# Patient Record
Sex: MALE | Race: WHITE | NOT HISPANIC OR LATINO | Employment: STUDENT | URBAN - METROPOLITAN AREA
[De-identification: names, ages, dates, MRNs, and addresses within clinical notes are randomized per-mention and may not be internally consistent; named-entity substitution may affect disease eponyms.]

---

## 2018-03-07 ENCOUNTER — OFFICE VISIT (OUTPATIENT)
Dept: ORTHOPEDIC SURGERY | Facility: CLINIC | Age: 53
End: 2018-03-07

## 2018-03-07 VITALS — HEIGHT: 72 IN | WEIGHT: 210 LBS | BODY MASS INDEX: 28.44 KG/M2 | TEMPERATURE: 98.7 F

## 2018-03-07 DIAGNOSIS — G89.29 CHRONIC RIGHT SHOULDER PAIN: Primary | ICD-10-CM

## 2018-03-07 DIAGNOSIS — M19.019 SHOULDER ARTHRITIS: ICD-10-CM

## 2018-03-07 DIAGNOSIS — M25.511 CHRONIC RIGHT SHOULDER PAIN: Primary | ICD-10-CM

## 2018-03-07 PROCEDURE — 73030 X-RAY EXAM OF SHOULDER: CPT | Performed by: ORTHOPAEDIC SURGERY

## 2018-03-07 PROCEDURE — 99204 OFFICE O/P NEW MOD 45 MIN: CPT | Performed by: ORTHOPAEDIC SURGERY

## 2018-03-07 RX ORDER — NAPROXEN SODIUM 220 MG
220 TABLET ORAL 2 TIMES DAILY PRN
COMMUNITY

## 2018-03-07 RX ORDER — GABAPENTIN 600 MG/1
600 TABLET ORAL 3 TIMES DAILY
COMMUNITY

## 2018-03-07 RX ORDER — TRAMADOL HYDROCHLORIDE 50 MG/1
50 TABLET ORAL EVERY 6 HOURS PRN
COMMUNITY

## 2018-03-08 NOTE — PROGRESS NOTES
"Patient: Shon Casarez    YOB: 1965    Medical Record Number: [id number]    Chief Complaints:  Right shoulder pain    History of Present Illness:     52 y.o. male patient who presents with a long history of progressively worsening pain and dysfunction affecting the right shoulder.  He tells me that he had the shoulder \"cleaned out\" about 6 years ago.  He has continued to have problems ever since.  He was in the [employer] for 26 years and he feels that this is what contributed significantly to the onset of his symptoms.  He describes his pain as severe, intermittent, and grinding.  He notices clicking and popping in the shoulder.  Rest, ice, anti-inflammatories and injections have all helped in the past.  Initially, the injections were very beneficial but the last injection provided only transient relief for him.  He feels that his symptoms are getting worse.  This is affecting his quality of life as well as his work.  He comes in today hoping to discuss further options.  Of note, he does have symptoms in the left shoulder as well but these are much more mild.  He also tells me that he has a history of a previous distal biceps tendon rupture in the right arm.  An attempt to fix this was made but it failed and he developed recurrent deformity.  This is been a long-standing issue.    Allergies: No Known Allergies    Home Medications:    Current Outpatient Prescriptions:   •  gabapentin (NEURONTIN) 600 MG tablet, Take 600 mg by mouth 3 (Three) Times a Day., Disp: , Rfl:   •  naproxen sodium (ALEVE) 220 MG tablet, Take 220 mg by mouth 2 (Two) Times a Day As Needed., Disp: , Rfl:   •  traMADol (ULTRAM) 50 MG tablet, Take 50 mg by mouth Every 6 (Six) Hours As Needed for Moderate Pain ., Disp: , Rfl:   •  TraZODone & Diet Manage Prod (TRAZAMINE) 50 MG misc, Take  by mouth., Disp: , Rfl:     History reviewed. No pertinent past medical history.    Past Surgical History:   Procedure Laterality Date   • KNEE " "SURGERY Left     replacement   • KNEE SURGERY Left     scope   • SHOULDER SURGERY Bilateral     scope       Social History     Occupational History   • Not on file.     Social History Main Topics   • Smoking status: Never Smoker   • Smokeless tobacco: Never Used   • Alcohol use Not on file   • Drug use: Not on file   • Sexual activity: Not on file      Social History     Social History Narrative   • No narrative on file       History reviewed. No pertinent family history.    Review of Systems:      Constitutional: Denies fever, shaking or chills   Eyes: Denies change in visual acuity   HEENT: Denies nasal congestion or sore throat   Respiratory: Denies cough or shortness of breath   Cardiovascular: Denies chest pain or edema  Endocrine: Denies tremors, palpitations, intolerance of heat or cold, polyuria, polydipsia.  GI: Denies abdominal pain, nausea, vomiting, bloody stools or diarrhea  : Denies frequency, urgency, incontinence, retention, or nocturia.  Musculoskeletal: Denies numbness tingling or loss of motor function except as above  Integument: Denies rash, lesion or ulceration   Neurologic: Denies headache or focal weakness, deficits  Heme: Denies epistaxis, spontaneous or excessive bleeding, epistaxis, hematuria, melena, fatigue, enlarged or tender lymph nodes.      All other pertinent positives and negatives as noted above in HPI.    Physical Exam: 52 y.o. male  Vitals:    03/07/18 1005   Temp: 98.7 °F (37.1 °C)   Weight: 95.3 kg (210 lb)   Height: 182.9 cm (72\")     General:  Patient is awake and alert.  Appears in no acute distress or discomfort.    Psych:  Affect and demeanor are appropriate.    Eyes:  Conjunctiva and sclera appear grossly normal.  Eyes track well and EOM seem to be intact.    Ears:  No gross abnormalities.  Hearing adequate for the exam.    Cardiovascular:  Regular rate and rhythm.    Lungs:  Good chest expansion.  Breathing unlabored.    Spine:  Neck appears grossly normal.  No " palpable masses or adenopathy.  Good motion.  Spurling's maneuver is negative for any shoulder or arm symptoms.    Extremities:  Skin is benign.  No obvious gross abnormalities about right shoulder.  No palpable masses or adenopathy.  Moderate tenderness noted over anterior glenohumeral joint and rotator interval.  Motion is to 160° of forward elevation, 35° external rotation, lacks 10° of horizontal abduction, T12 internal rotation.  No instability.  Rotator cuff strength seems to be well preserved but the exam is limited due to patient discomfort with resisted active motion.  Good motor and sensory function in lower arm and hand.  Palpable pulses.         Radiology:  AP, scapular Y, and axillary views of the right shoulder are ordered by myself and reviewed to evaluate the patient's complaint.  No comparison films are immediately available.  The x-rays show moderate glenohumeral osteoarthritis with joint space narrowing, osteophyte formation, and subchondral sclerosis.  The acromiohumeral interval measures normal.    Assessment/Plan:  Right shoulder osteoarthritis    We discussed treatment options in detail including the risks, benefits, and alternatives of conservative treatment versus surgical options.  He has tried activity modifications, anti-inflammatories and injections in the past.  None of these conservative treatments have offered him significant relief.  He has expressed interest in pursuing an arthroplasty.  The risks, benefits, and alternatives were discussed.  He wants to pursue that.  We will look at getting him scheduled for that.  I do want to see him back for a preoperative visit.      Procedures      George Mcadams MD    03/07/2018    CC to Alix Olmstead MD

## 2018-11-26 ENCOUNTER — TELEPHONE (OUTPATIENT)
Dept: ORTHOPEDIC SURGERY | Facility: CLINIC | Age: 53
End: 2018-11-26

## 2018-11-26 ENCOUNTER — PREP FOR SURGERY (OUTPATIENT)
Dept: OTHER | Facility: HOSPITAL | Age: 53
End: 2018-11-26

## 2018-11-26 DIAGNOSIS — M19.019 SHOULDER ARTHRITIS: Primary | ICD-10-CM

## 2018-11-26 RX ORDER — CEFAZOLIN SODIUM 2 G/100ML
2 INJECTION, SOLUTION INTRAVENOUS ONCE
Status: CANCELLED | OUTPATIENT
Start: 2018-12-19 | End: 2018-11-26

## 2018-11-26 RX ORDER — PREGABALIN 75 MG/1
150 CAPSULE ORAL ONCE
Status: CANCELLED | OUTPATIENT
Start: 2018-12-19 | End: 2018-11-26

## 2018-11-26 RX ORDER — VANCOMYCIN HYDROCHLORIDE 1 G/200ML
1000 INJECTION, SOLUTION INTRAVENOUS ONCE
Status: CANCELLED | OUTPATIENT
Start: 2018-12-19 | End: 2018-11-26

## 2018-11-26 RX ORDER — MELOXICAM 15 MG/1
15 TABLET ORAL ONCE
Status: CANCELLED | OUTPATIENT
Start: 2018-12-19 | End: 2018-11-26

## 2018-11-26 RX ORDER — ACETAMINOPHEN 500 MG
1000 TABLET ORAL ONCE
Status: CANCELLED | OUTPATIENT
Start: 2018-12-19 | End: 2018-11-26

## 2018-11-30 PROBLEM — M19.019 SHOULDER ARTHRITIS: Status: ACTIVE | Noted: 2018-11-30

## 2018-12-10 ENCOUNTER — APPOINTMENT (OUTPATIENT)
Dept: PREADMISSION TESTING | Facility: HOSPITAL | Age: 53
End: 2018-12-10

## 2022-10-10 ENCOUNTER — TELEPHONE (OUTPATIENT)
Dept: GASTROENTEROLOGY | Facility: CLINIC | Age: 57
End: 2022-10-10

## 2022-10-10 NOTE — TELEPHONE ENCOUNTER
----- Message from David Gutierrez sent at 10/10/2022  9:18 AM EDT -----  Contact: [phone]  Contact patient for oa questionairelza

## 2022-11-28 ENCOUNTER — PRE-PROCEDURE SCREENING (OUTPATIENT)
Dept: GASTROENTEROLOGY | Facility: CLINIC | Age: 57
End: 2022-11-28

## 2022-11-28 NOTE — TELEPHONE ENCOUNTER
Colonoscopy screening--No personal history of polyps--No family history of polyps or colon cancer--No blood thinners--Medications:                  gabapentin (NEURONTIN) 600 MG tablet  naproxen sodium (ALEVE) 220 MG tablet  traMADol (ULTRAM) 50 MG tablet  TraZODone & Diet Manage Prod (TRAZAMINE) 50 MG misc                     QUESTIONNAIRE SCEEENING & HAS BEEN SENT TO DOCTOR FOR REVIEW

## 2022-11-29 ENCOUNTER — PREP FOR SURGERY (OUTPATIENT)
Dept: OTHER | Facility: HOSPITAL | Age: 57
End: 2022-11-29

## 2022-11-29 DIAGNOSIS — Z12.11 ENCOUNTER FOR SCREENING FOR MALIGNANT NEOPLASM OF COLON: Primary | ICD-10-CM

## 2022-12-29 ENCOUNTER — TELEPHONE (OUTPATIENT)
Dept: GASTROENTEROLOGY | Facility: CLINIC | Age: 57
End: 2022-12-29
Payer: OTHER GOVERNMENT

## 2022-12-29 NOTE — TELEPHONE ENCOUNTER
Caller: Shon Casarez    Relationship to patient: Self    Best call back number: [phone]     Chief complaint: PT WANTS TO SCHEDULE SCOPE    Type of visit: COLONOSCOPY    Requested date: FIRST AVAILABLE       Additional notes:QUESTIONAIRE HAS BEEN RECEIVED

## 2023-01-03 PROBLEM — Z12.11 ENCOUNTER FOR SCREENING FOR MALIGNANT NEOPLASM OF COLON: Status: ACTIVE | Noted: 2023-01-03

## 2023-01-03 NOTE — TELEPHONE ENCOUNTER
ELSY Lucero for colonoscopy on 3/23/23 arrival at 1030am . Mailed Prep instructions to Mailing address on-file. ----[employer]

## 2023-01-23 ENCOUNTER — TELEPHONE (OUTPATIENT)
Dept: GASTROENTEROLOGY | Facility: CLINIC | Age: 58
End: 2023-01-23

## 2023-01-23 NOTE — TELEPHONE ENCOUNTER
Caller: Shon Casarez    Relationship to patient: Self    Best call back number: [phone]    Patient is needing: FOR OFFICE TO RE-MAIL HIS PREP INSTRUCTIONS FOR HIS UPCOMING COLONOSCOPY IN MARCH.

## 2023-03-23 ENCOUNTER — ANESTHESIA EVENT (OUTPATIENT)
Dept: GASTROENTEROLOGY | Facility: HOSPITAL | Age: 58
End: 2023-03-23
Payer: OTHER GOVERNMENT

## 2023-03-23 ENCOUNTER — HOSPITAL ENCOUNTER (OUTPATIENT)
Facility: HOSPITAL | Age: 58
Setting detail: HOSPITAL OUTPATIENT SURGERY
Discharge: HOME | End: 2023-03-23
Attending: INTERNAL MEDICINE | Admitting: INTERNAL MEDICINE
Payer: OTHER GOVERNMENT

## 2023-03-23 ENCOUNTER — ANESTHESIA (OUTPATIENT)
Dept: GASTROENTEROLOGY | Facility: HOSPITAL | Age: 58
End: 2023-03-23
Payer: OTHER GOVERNMENT

## 2023-03-23 VITALS
HEART RATE: 71 BPM | HEIGHT: 72 IN | RESPIRATION RATE: 7 BRPM | OXYGEN SATURATION: 93 % | SYSTOLIC BLOOD PRESSURE: 101 MMHG | BODY MASS INDEX: 30.65 KG/M2 | DIASTOLIC BLOOD PRESSURE: 69 MMHG | WEIGHT: 226.3 LBS

## 2023-03-23 DIAGNOSIS — Z12.11 ENCOUNTER FOR SCREENING FOR MALIGNANT NEOPLASM OF COLON: ICD-10-CM

## 2023-03-23 PROCEDURE — 45380 COLONOSCOPY AND BIOPSY: CPT | Performed by: INTERNAL MEDICINE

## 2023-03-23 PROCEDURE — 88305 TISSUE EXAM BY PATHOLOGIST: CPT | Performed by: INTERNAL MEDICINE

## 2023-03-23 PROCEDURE — S0260 H&P FOR SURGERY: HCPCS | Performed by: INTERNAL MEDICINE

## 2023-03-23 PROCEDURE — 25010000002 PROPOFOL 10 MG/ML EMULSION: Performed by: ANESTHESIOLOGY

## 2023-03-23 RX ORDER — LOSARTAN POTASSIUM 50 MG/1
50 TABLET ORAL DAILY
COMMUNITY

## 2023-03-23 RX ORDER — LIDOCAINE HYDROCHLORIDE 20 MG/ML
INJECTION, SOLUTION INFILTRATION; PERINEURAL AS NEEDED
Status: DISCONTINUED | OUTPATIENT
Start: 2023-03-23 | End: 2023-03-23 | Stop reason: SURG

## 2023-03-23 RX ORDER — SODIUM CHLORIDE, SODIUM LACTATE, POTASSIUM CHLORIDE, CALCIUM CHLORIDE 600; 310; 30; 20 MG/100ML; MG/100ML; MG/100ML; MG/100ML
30 INJECTION, SOLUTION INTRAVENOUS CONTINUOUS PRN
Status: DISCONTINUED | OUTPATIENT
Start: 2023-03-23 | End: 2023-03-23 | Stop reason: HOSPADM

## 2023-03-23 RX ORDER — HYDROCODONE BITARTRATE AND ACETAMINOPHEN 7.5; 325 MG/1; MG/1
1 TABLET ORAL EVERY 6 HOURS PRN
COMMUNITY

## 2023-03-23 RX ORDER — SERTRALINE HYDROCHLORIDE 100 MG/1
150 TABLET, FILM COATED ORAL DAILY
COMMUNITY

## 2023-03-23 RX ORDER — PROPOFOL 10 MG/ML
VIAL (ML) INTRAVENOUS AS NEEDED
Status: DISCONTINUED | OUTPATIENT
Start: 2023-03-23 | End: 2023-03-23 | Stop reason: SURG

## 2023-03-23 RX ADMIN — LIDOCAINE HYDROCHLORIDE 50 MG: 20 INJECTION, SOLUTION INFILTRATION; PERINEURAL at 11:06

## 2023-03-23 RX ADMIN — SODIUM CHLORIDE, POTASSIUM CHLORIDE, SODIUM LACTATE AND CALCIUM CHLORIDE 30 ML/HR: 600; 310; 30; 20 INJECTION, SOLUTION INTRAVENOUS at 10:34

## 2023-03-23 RX ADMIN — PROPOFOL 380 MG: 10 INJECTION, EMULSION INTRAVENOUS at 11:06

## 2023-03-23 NOTE — ANESTHESIA POSTPROCEDURE EVALUATION
Patient: Shon Casarez    Procedure Summary     Date: 03/23/23 Room / Location:  [facility]    Anesthesia Start: 1058 Anesthesia Stop: 1123    Procedure: COLONOSCOPY INTO CECUM/ TERMINAL ILEUM WITH POLYPECTOMY Diagnosis:       Encounter for screening for malignant neoplasm of colon      (Encounter for screening for malignant neoplasm of colon [Z12.11])    Surgeons: Bradley Hebert MD Provider: Cristobal Prince MD    Anesthesia Type: MAC ASA Status: 2          Anesthesia Type: MAC    Vitals  Vitals Value Taken Time   /69 03/23/23 1200   Temp     Pulse 71 03/23/23 1200   Resp 7 03/23/23 1200   SpO2 93 % 03/23/23 1150           Anesthesia Post Evaluation

## 2023-03-23 NOTE — H&P
"[facility]  Pre Procedure History & Physical    Chief Complaint:   Time for my colonoscopy    Subjective     HPI:   57 y.o. male presenting to endoscopy unit today for surveillance colonoscopy.    Past Medical History:   Past Medical History:   Diagnosis Date   • Anxiety    • Hyperlipidemia        Family History:  History reviewed. No pertinent family history.    Social History:   reports that he quit smoking about 5 years ago. His smoking use included cigarettes. He has never used smokeless tobacco. He reports that he does not currently use alcohol. He reports that he does not currently use drugs.    Medications:   Medications Prior to Admission   Medication Sig Dispense Refill Last Dose   • HYDROcodone-acetaminophen (NORCO) 7.5-325 MG per tablet Take 1 tablet by mouth Every 6 (Six) Hours As Needed for Moderate Pain.   3/22/2023   • losartan (COZAAR) 50 MG tablet Take 1 tablet by mouth Daily.   3/23/2023   • sertraline (ZOLOFT) 100 MG tablet Take 1.5 tablets by mouth Daily.   3/23/2023   • gabapentin (NEURONTIN) 600 MG tablet Take 1 tablet by mouth 3 (Three) Times a Day.   More than a month   • naproxen sodium (ALEVE) 220 MG tablet Take 1 tablet by mouth 2 (Two) Times a Day As Needed.   3/21/2023   • traMADol (ULTRAM) 50 MG tablet Take 1 tablet by mouth Every 6 (Six) Hours As Needed for Moderate Pain.   3/21/2023   • TraZODone & Diet Manage Prod (TRAZAMINE) 50 MG misc Take  by mouth.   More than a month       Allergies:  Patient has no known allergies.    Objective     Blood pressure 125/88, pulse 93, resp. rate 11, height 182.9 cm (72\"), weight 103 kg (226 lb 4.8 oz), SpO2 95 %.  Physical Exam:   General: patient awake, alert and cooperative    Assessment & Plan     Diagnosis:  Personal hx of colon polyps    Anticipated Surgical Procedure:  Colonoscopy    The risks, benefits, and alternatives of this procedure have been discussed with the patient or the responsible party- the patient " understands and agrees to proceed.

## 2023-03-23 NOTE — ANESTHESIA PREPROCEDURE EVALUATION
Anesthesia Evaluation     NPO Solid Status: > 8 hours             Airway   Mallampati: II  TM distance: >3 FB  Neck ROM: full  no difficulty expected  Dental - normal exam     Pulmonary - normal exam   Cardiovascular - normal exam    (+) hyperlipidemia,       Neuro/Psych  (+) psychiatric history Anxiety,    GI/Hepatic/Renal/Endo    (+) obesity,       Musculoskeletal     Abdominal  - normal exam   Substance History      OB/GYN          Other   arthritis,                      Anesthesia Plan    ASA 2     MAC     intravenous induction     Anesthetic plan, risks, benefits, and alternatives have been provided, discussed and informed consent has been obtained with: patient.        CODE STATUS:

## 2023-03-23 NOTE — DISCHARGE INSTRUCTIONS
For the next 24 hours patient needs to be with a responsible adult.    For 24 hours DO NOT drive, operate machinery, appliances, drink alcohol, make important decisions or sign legal documents.    Start with a light or bland diet if you are feeling sick to your stomach otherwise advance to regular diet as tolerated.    Follow recommendations on procedure report if provided by your doctor.    Call Dr Hebert for problems [phone]    Problems may include but not limited to: large amounts of bleeding, trouble breathing, repeated vomiting, severe unrelieved pain, fever or chills.

## 2023-03-24 LAB
LAB AP CASE REPORT: NORMAL
PATH REPORT.FINAL DX SPEC: NORMAL
PATH REPORT.GROSS SPEC: NORMAL

## 2023-05-24 ENCOUNTER — TELEPHONE (OUTPATIENT)
Dept: GASTROENTEROLOGY | Facility: CLINIC | Age: 58
End: 2023-05-24
Payer: OTHER GOVERNMENT

## 2023-05-24 NOTE — TELEPHONE ENCOUNTER
----- Message from Bradley Hebert MD sent at 3/25/2023  3:59 PM EDT -----  Benign pathology  Colonoscopy recall 5  yrs

## (undated) DEVICE — LN SMPL CO2 SHTRM SD STREAM W/M LUER

## (undated) DEVICE — SENSR O2 OXIMAX FNGR A/ 18IN NONSTR

## (undated) DEVICE — ADAPT CLN BIOGUARD AIR/H2O DISP

## (undated) DEVICE — TUBING, SUCTION, 1/4" X 10', STRAIGHT: Brand: MEDLINE

## (undated) DEVICE — CANN O2 ETCO2 FITS ALL CONN CO2 SMPL A/ 7IN DISP LF

## (undated) DEVICE — KT ORCA ORCAPOD DISP STRL

## (undated) DEVICE — SINGLE-USE BIOPSY FORCEPS: Brand: RADIAL JAW 4